# Patient Record
Sex: FEMALE | Race: WHITE | NOT HISPANIC OR LATINO | URBAN - METROPOLITAN AREA
[De-identification: names, ages, dates, MRNs, and addresses within clinical notes are randomized per-mention and may not be internally consistent; named-entity substitution may affect disease eponyms.]

---

## 2024-08-20 ENCOUNTER — LAB REQUISITION (OUTPATIENT)
Dept: LAB | Facility: HOSPITAL | Age: 24
End: 2024-08-20
Payer: COMMERCIAL

## 2024-08-20 DIAGNOSIS — R30.0 DYSURIA: ICD-10-CM

## 2024-08-20 DIAGNOSIS — N76.0 ACUTE VAGINITIS: ICD-10-CM

## 2024-08-20 PROCEDURE — 87086 URINE CULTURE/COLONY COUNT: CPT

## 2024-08-20 PROCEDURE — 87205 SMEAR GRAM STAIN: CPT

## 2024-08-22 LAB
BACTERIA UR CULT: NORMAL
CLUE CELLS VAG LPF-#/AREA: NORMAL /[LPF]
NUGENT SCORE: 3
YEAST VAG WET PREP-#/AREA: NORMAL

## 2024-11-13 ENCOUNTER — OFFICE VISIT (OUTPATIENT)
Dept: URGENT CARE | Age: 24
End: 2024-11-13

## 2024-11-13 VITALS
TEMPERATURE: 98.3 F | DIASTOLIC BLOOD PRESSURE: 86 MMHG | RESPIRATION RATE: 18 BRPM | HEART RATE: 107 BPM | BODY MASS INDEX: 25.36 KG/M2 | WEIGHT: 137.8 LBS | HEIGHT: 62 IN | OXYGEN SATURATION: 96 % | SYSTOLIC BLOOD PRESSURE: 128 MMHG

## 2024-11-13 DIAGNOSIS — L02.92 FURUNCLE: Primary | ICD-10-CM

## 2024-11-13 PROCEDURE — 10060 I&D ABSCESS SIMPLE/SINGLE: CPT | Performed by: PHYSICIAN ASSISTANT

## 2024-11-13 PROCEDURE — 99213 OFFICE O/P EST LOW 20 MIN: CPT | Performed by: PHYSICIAN ASSISTANT

## 2024-11-13 PROCEDURE — 3008F BODY MASS INDEX DOCD: CPT | Performed by: PHYSICIAN ASSISTANT

## 2024-11-13 RX ORDER — AZITHROMYCIN 250 MG/1
TABLET, FILM COATED ORAL
COMMUNITY
Start: 2024-11-11

## 2024-11-13 RX ORDER — SULFAMETHOXAZOLE AND TRIMETHOPRIM 800; 160 MG/1; MG/1
1 TABLET ORAL 2 TIMES DAILY
Qty: 10 TABLET | Refills: 0 | Status: SHIPPED | OUTPATIENT
Start: 2024-11-13 | End: 2024-11-18

## 2024-11-13 RX ORDER — ALBUTEROL SULFATE 90 UG/1
2 INHALANT RESPIRATORY (INHALATION) EVERY 6 HOURS PRN
COMMUNITY
Start: 2024-11-11 | End: 2024-12-11

## 2024-11-13 ASSESSMENT — ENCOUNTER SYMPTOMS
CHILLS: 0
SHORTNESS OF BREATH: 0
CHEST TIGHTNESS: 0
ROS SKIN COMMENTS: LUMP ON SCALP
FEVER: 0
DIARRHEA: 0
CONFUSION: 0
AGITATION: 0
FATIGUE: 0
VOMITING: 0
COUGH: 0
ABDOMINAL PAIN: 0
JOINT SWELLING: 0
NAUSEA: 0
ARTHRALGIAS: 0

## 2024-11-13 ASSESSMENT — PAIN SCALES - GENERAL: PAINLEVEL_OUTOF10: 2

## 2024-11-13 NOTE — PROGRESS NOTES
"Subjective   Patient ID: Jacquie De La Cruz is a 24 y.o. female. They present today with a chief complaint of Hair/Scalp Problem (Painful swollen area behind right ear x 2 weeks).    History of Present Illness  Pt presented with small lump on scalp for wks. States noticed increase of size in past 2-3 days with mild tenderness. Not sure what it is and wanted to be checked.           Past Medical History  Allergies as of 11/13/2024    (No Known Allergies)       (Not in a hospital admission)       Past Medical History:   Diagnosis Date    Other specified health status     No pertinent past medical history       History reviewed. No pertinent surgical history.         Review of Systems  Review of Systems   Constitutional:  Negative for chills, fatigue and fever.   Respiratory:  Negative for cough, chest tightness and shortness of breath.    Cardiovascular:  Negative for chest pain.   Gastrointestinal:  Negative for abdominal pain, diarrhea, nausea and vomiting.   Musculoskeletal:  Negative for arthralgias and joint swelling.   Skin:  Negative for rash.        Lump on scalp   Psychiatric/Behavioral:  Negative for agitation and confusion.                                   Objective    Vitals:    11/13/24 1152   BP: 128/86   BP Location: Right arm   Patient Position: Sitting   BP Cuff Size: Adult   Pulse: 107   Resp: 18   Temp: 36.8 °C (98.3 °F)   TempSrc: Oral   SpO2: 96%   Weight: 62.5 kg (137 lb 12.8 oz)   Height: 1.575 m (5' 2\")     Patient's last menstrual period was 10/21/2024 (approximate).    Physical Exam  Constitutional:       Appearance: Normal appearance.   HENT:      Head: Normocephalic and atraumatic.      Right Ear: Tympanic membrane, ear canal and external ear normal.      Left Ear: Tympanic membrane, ear canal and external ear normal.      Nose: Nose normal.   Cardiovascular:      Rate and Rhythm: Normal rate and regular rhythm.      Heart sounds: No murmur heard.  Pulmonary:      Effort: Pulmonary effort " is normal. No respiratory distress.      Breath sounds: No stridor. No wheezing, rhonchi or rales.   Musculoskeletal:         General: No swelling or tenderness. Normal range of motion.   Skin:     Comments: Small lump on scalp behind right ear with induration, mild tenderness on palpation   Neurological:      Mental Status: She is alert and oriented to person, place, and time.   Psychiatric:         Mood and Affect: Mood normal.         Incision and Drainage    Date/Time: 11/13/2024 12:20 PM    Performed by: Jessica Lincoln PA-C  Authorized by: Jessica Lincoln PA-C    Consent:     Consent obtained:  Verbal    Consent given by:  Patient    Risks, benefits, and alternatives were discussed: yes      Risks discussed:  Bleeding, incomplete drainage, pain and infection    Alternatives discussed:  Alternative treatment  Universal protocol:     Procedure explained and questions answered to patient or proxy's satisfaction: yes      Required blood products, implants, devices, and special equipment available: no      Patient identity confirmed:  Verbally with patient  Location:     Type:  Abscess    Size:  5cm    Location:  Lower extremity    Lower extremity location:  Buttock    Buttock location:  R buttock  Pre-procedure details:     Skin preparation:  Povidone-iodine  Anesthesia:     Anesthesia method:  Topical application  Procedure type:     Complexity:  Simple  Procedure details:     Wound management:  Irrigated with saline    Drainage:  Purulent    Drainage amount:  Scant    Wound treatment:  Wound left open    Packing materials:  None  Post-procedure details:     Procedure completion:  Tolerated well, no immediate complications  Comments:      Used 18 gauge needle to drain       Point of Care Test & Imaging Results from this visit  No results found for this visit on 11/13/24.   No results found.    Diagnostic study results (if any) were reviewed by Jessica Lincoln PA-C.    Assessment/Plan   Allergies, medications, history, and  pertinent labs/EKGs/Imaging reviewed by Jessica Lincoln PA-C.     Medical Decision Making  Pt tolerated well      Orders and Diagnoses  Diagnoses and all orders for this visit:  Furuncle  -     sulfamethoxazole-trimethoprim (Bactrim DS) 800-160 mg tablet; Take 1 tablet by mouth 2 times a day for 5 days.  Other orders  -     Incision and Drainage      Medical Admin Record      Patient disposition: Home    Electronically signed by Jessica Lincoln PA-C  12:21 PM

## 2025-01-31 ENCOUNTER — OFFICE VISIT (OUTPATIENT)
Dept: URGENT CARE | Age: 25
End: 2025-01-31
Payer: COMMERCIAL

## 2025-01-31 VITALS
TEMPERATURE: 97 F | HEART RATE: 86 BPM | OXYGEN SATURATION: 98 % | SYSTOLIC BLOOD PRESSURE: 122 MMHG | DIASTOLIC BLOOD PRESSURE: 85 MMHG

## 2025-01-31 DIAGNOSIS — S61.452A DOG BITE OF LEFT HAND, INITIAL ENCOUNTER: Primary | ICD-10-CM
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
## 2025-01-31 RX ORDER — AMOXICILLIN AND CLAVULANATE POTASSIUM 875; 125 MG/1; MG/1
875 TABLET, FILM COATED ORAL 2 TIMES DAILY
Qty: 20 TABLET | Refills: 0 | Status: SHIPPED | OUTPATIENT
Start: 2025-01-31 | End: 2025-01-31

## 2025-01-31 RX ORDER — AMOXICILLIN AND CLAVULANATE POTASSIUM 875; 125 MG/1; MG/1
875 TABLET, FILM COATED ORAL 2 TIMES DAILY
Qty: 20 TABLET | Refills: 0 | Status: SHIPPED | OUTPATIENT
Start: 2025-01-31

## 2025-01-31 RX ORDER — DROSPIRENONE AND ETHINYL ESTRADIOL 0.03MG-3MG
1 KIT ORAL
COMMUNITY
Start: 2025-01-27 | End: 2026-01-27

## 2025-01-31 NOTE — PROGRESS NOTES
Subjective   Patient ID: Jacquie De La Cruz is a 24 y.o. female. They present today with a chief complaint of Worker's Compensation (Dog bite - left hand).    History of Present Illness  HPI  Pt is a 24 yr old female who presents with puncture wound to left palm of hand from dog bite.  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  She is UTD on tetanus from 2020.  No other injury  Past Medical History  Allergies as of 01/31/2025    (No Known Allergies)       (Not in a hospital admission)       Past Medical History:   Diagnosis Date    Other specified health status     No pertinent past medical history       No past surgical history on file.         Review of Systems  Review of Systems  Gen: No fatigue, fever, sweats.  Head: No headache, trauma.  Eyes: No vision loss, double vision, drainage, eye pain.  ENT: No hearing changes, pain, epistaxis, congestion  Cardiac: No chest pain  Pulmonary: No shortness of breath,  pleuritic pain,   Heme/lymph: No swollen glands  Musculoskeletal: No limb pain, joint pain, back pain, joint swelling or stiffness.  Skin: + puncture wound to left hand  Neuro: No Numbness, tingling, or weakness.  Psych: No  anxiety     Review of systems is otherwise negative unless stated above or in history of present illness.                             Objective    Vitals:    01/31/25 1001   BP: 122/85   Pulse: 86   Temp: 36.1 °C (97 °F)   SpO2: 98%     No LMP recorded. (Menstrual status: OCP).    Physical Exam  Hand Injury:   General: Vitals noted, no distress. Afebrile.   Cardiac: Regular, rate, rhythm, no murmur.   Pulmonary: Lungs clear bilaterally with good aeration. No adventitious breath sounds.   Extremities: No peripheral edema. Exam of the left hand shows 2 small puncture wounds to palm of hand no foreign body on exam explored wound in detail.  Is neurovascularly intact distally. Specifically, has full strength  with flexion and extension of the digits. Is nontender over the wrist. Remainder the extremity is nontender.   Neuro: No focal neurologic deficits, NIH score of 0.     Laceration Repair    Date/Time: 1/31/2025 10:40 AM    Performed by: VARSHA Cooper  Authorized by: VARSHA Cooper    Consent:     Consent obtained:  Verbal    Consent given by:  Patient    Risks, benefits, and alternatives were discussed: yes      Risks discussed:  Infection and poor wound healing  Universal protocol:     Patient identity confirmed:  Verbally with patient  Anesthesia:     Anesthesia method:  None  Laceration details:     Location:  Hand    Hand location:  L palm  Treatment:     Area cleansed with:  Rk    Amount of cleaning:  Standard    Irrigation solution:  Sterile saline    Visualized foreign bodies/material removed: no (explored wound in depth no foreign body)    Skin repair:     Repair method: bandaid.  Repair type:     Repair type:  Simple  Post-procedure details:     Dressing:  Adhesive bandage      Point of Care Test & Imaging Results from this visit  No results found for this visit on 01/31/25.   No results found.    Diagnostic study results (if any) were reviewed by VARSHA Cooper.    Assessment/Plan   Allergies, medications, history, and pertinent labs/EKGs/Imaging reviewed by VARSHA Cooper.     Medical Decision Making  History and physical consistent with dog bite no foreign body on exam, no tendon injury, no signs of infection.  Tetanus up to date.  Put on abx for preventative infection.    Note sent to supervising physician Dr Lopez due to workers comp    Orders and Diagnoses  Diagnoses and all orders for this visit:  Dog bite of left hand, initial encounter  -     amoxicillin-pot clavulanate (Augmentin) 875-125 mg tablet; Take 1 tablet (875 mg) by mouth 2 times a day.  Other orders  -     Laceration Repair      Medical Admin Record      Patient disposition:  Home    Electronically signed by CHAIM Cooper-CNP  10:43 AM

## 2025-01-31 NOTE — PATIENT INSTRUCTIONS
Antibiotics were called into pharmacy. You are updated on your tetanus 8/2020. Keep the wounds clean and dry. Just use soap and water to clean them. Do not use Neosporin or bacitracin ointment. You may cover them with a Band-Aid. Look for any signs of infection if this occurs come back and we will reevaluate otherwise follow-up with your primary care doctor on an outpatient basis.

## 2025-01-31 NOTE — Clinical Note
24 yr old female puncture wound from dog bite today utd on tetanus wound explored no foreign body.  Sent home on abx.  Only needed cleaned and dressing.  Happened at work